# Patient Record
Sex: MALE | Race: WHITE | Employment: OTHER | URBAN - METROPOLITAN AREA
[De-identification: names, ages, dates, MRNs, and addresses within clinical notes are randomized per-mention and may not be internally consistent; named-entity substitution may affect disease eponyms.]

---

## 2018-07-31 ENCOUNTER — APPOINTMENT (OUTPATIENT)
Dept: CT IMAGING | Age: 43
End: 2018-07-31

## 2018-07-31 ENCOUNTER — HOSPITAL ENCOUNTER (EMERGENCY)
Age: 43
Discharge: HOME | End: 2018-07-31
Attending: EMERGENCY MEDICINE
Payer: COMMERCIAL

## 2018-07-31 ENCOUNTER — APPOINTMENT (OUTPATIENT)
Dept: GENERAL RADIOLOGY | Age: 43
End: 2018-07-31

## 2018-07-31 VITALS
WEIGHT: 228 LBS | HEIGHT: 71 IN | SYSTOLIC BLOOD PRESSURE: 122 MMHG | HEART RATE: 75 BPM | RESPIRATION RATE: 15 BRPM | OXYGEN SATURATION: 100 % | BODY MASS INDEX: 31.92 KG/M2 | TEMPERATURE: 98 F | DIASTOLIC BLOOD PRESSURE: 80 MMHG

## 2018-07-31 DIAGNOSIS — T07.XXXA MULTIPLE ABRASIONS: ICD-10-CM

## 2018-07-31 DIAGNOSIS — S09.90XA INJURY OF HEAD, INITIAL ENCOUNTER: Primary | ICD-10-CM

## 2018-07-31 LAB
BASOPHILS ABSOLUTE: 0 K/UL (ref 0–0.2)
BASOPHILS RELATIVE PERCENT: 0.4 %
EOSINOPHILS ABSOLUTE: 0.2 K/UL (ref 0–0.6)
EOSINOPHILS RELATIVE PERCENT: 2.8 %
HCT VFR BLD CALC: 45.1 % (ref 40.5–52.5)
HEMOGLOBIN: 15.2 G/DL (ref 13.5–17.5)
LYMPHOCYTES ABSOLUTE: 2.7 K/UL (ref 1–5.1)
LYMPHOCYTES RELATIVE PERCENT: 35 %
MCH RBC QN AUTO: 30.7 PG (ref 26–34)
MCHC RBC AUTO-ENTMCNC: 33.8 G/DL (ref 31–36)
MCV RBC AUTO: 90.9 FL (ref 80–100)
MONOCYTES ABSOLUTE: 0.6 K/UL (ref 0–1.3)
MONOCYTES RELATIVE PERCENT: 8 %
NEUTROPHILS ABSOLUTE: 4.2 K/UL (ref 1.7–7.7)
NEUTROPHILS RELATIVE PERCENT: 53.8 %
PDW BLD-RTO: 14.6 % (ref 12.4–15.4)
PLATELET # BLD: 259 K/UL (ref 135–450)
PMV BLD AUTO: 9.2 FL (ref 5–10.5)
RBC # BLD: 4.96 M/UL (ref 4.2–5.9)
WBC # BLD: 7.8 K/UL (ref 4–11)

## 2018-07-31 PROCEDURE — 93005 ELECTROCARDIOGRAM TRACING: CPT | Performed by: EMERGENCY MEDICINE

## 2018-07-31 PROCEDURE — 2580000003 HC RX 258: Performed by: EMERGENCY MEDICINE

## 2018-07-31 PROCEDURE — 6370000000 HC RX 637 (ALT 250 FOR IP): Performed by: EMERGENCY MEDICINE

## 2018-07-31 PROCEDURE — 99285 EMERGENCY DEPT VISIT HI MDM: CPT

## 2018-07-31 PROCEDURE — 85025 COMPLETE CBC W/AUTO DIFF WBC: CPT

## 2018-07-31 PROCEDURE — 70450 CT HEAD/BRAIN W/O DYE: CPT

## 2018-07-31 PROCEDURE — 73502 X-RAY EXAM HIP UNI 2-3 VIEWS: CPT

## 2018-07-31 PROCEDURE — 72125 CT NECK SPINE W/O DYE: CPT

## 2018-07-31 RX ORDER — 0.9 % SODIUM CHLORIDE 0.9 %
500 INTRAVENOUS SOLUTION INTRAVENOUS ONCE
Status: COMPLETED | OUTPATIENT
Start: 2018-07-31 | End: 2018-07-31

## 2018-07-31 RX ORDER — ACETAMINOPHEN 500 MG
1000 TABLET ORAL ONCE
Status: COMPLETED | OUTPATIENT
Start: 2018-07-31 | End: 2018-07-31

## 2018-07-31 RX ADMIN — ACETAMINOPHEN 1000 MG: 500 TABLET ORAL at 22:29

## 2018-07-31 RX ADMIN — SODIUM CHLORIDE 500 ML: 9 INJECTION, SOLUTION INTRAVENOUS at 22:18

## 2018-07-31 ASSESSMENT — PAIN SCALES - GENERAL
PAINLEVEL_OUTOF10: 7
PAINLEVEL_OUTOF10: 7

## 2018-07-31 ASSESSMENT — PAIN DESCRIPTION - FREQUENCY: FREQUENCY: CONTINUOUS

## 2018-07-31 ASSESSMENT — PAIN DESCRIPTION - ORIENTATION: ORIENTATION: LEFT

## 2018-07-31 ASSESSMENT — PAIN DESCRIPTION - PAIN TYPE: TYPE: ACUTE PAIN

## 2018-07-31 ASSESSMENT — PAIN DESCRIPTION - LOCATION: LOCATION: NECK;FACE;HEAD

## 2018-07-31 ASSESSMENT — PAIN DESCRIPTION - ONSET: ONSET: SUDDEN

## 2018-08-01 LAB
EKG ATRIAL RATE: 77 BPM
EKG DIAGNOSIS: NORMAL
EKG P AXIS: 49 DEGREES
EKG P-R INTERVAL: 154 MS
EKG Q-T INTERVAL: 366 MS
EKG QRS DURATION: 86 MS
EKG QTC CALCULATION (BAZETT): 414 MS
EKG R AXIS: 23 DEGREES
EKG T AXIS: 21 DEGREES
EKG VENTRICULAR RATE: 77 BPM

## 2018-08-01 PROCEDURE — 93010 ELECTROCARDIOGRAM REPORT: CPT | Performed by: INTERNAL MEDICINE

## 2018-08-01 NOTE — ED PROVIDER NOTES
[Azithromycin]         Physical Exam       ED Triage Vitals [07/31/18 2124]   BP Temp Temp src Pulse Resp SpO2 Height Weight   124/82 98 °F (36.7 °C) -- 74 16 93 % 5' 11\" (1.803 m) 228 lb (103.4 kg)     GENERAL APPEARANCE: Awake and alert. Cooperative. No acute distress. No obvious discomfort. HEAD: Normocephalic. Left anterior scalp abrasion with no deformity or crepitus. EYES: Sclera anicteric. The pupils are equal, round, and reactive light. ENT: Tolerates saliva. No trismus. NECK: Supple. Trachea midline. No point tenderness, crepitance, or obvious deformity. CARDIO: RRR. Radial pulse 2+. No audible murmur. LUNGS: Respirations unlabored. CTAB and symmetrical fair to good air movement. No chest wall tenderness or deformity. .  ABDOMEN: Soft. Non-distended. Non-tender. Normoactive bowel sounds. EXTREMITIES: No acute deformities. Abrasion at the left deltoid, left elbow, and left lateral hip. SKIN: Warm and dry. As above otherwise unremarkable. NEUROLOGICAL: No gross facial drooping. Moves all 4 extremities spontaneously. Awake alert. GCS 15.  Speech is clear. No gross motor or sensory deficits. Cranial nerves III-12 grossly intact. Speech is clear. PSYCHIATRIC: Normal mood.      Diagnostics   Labs:  Results for orders placed or performed during the hospital encounter of 07/31/18   CBC auto differential   Result Value Ref Range    WBC 7.8 4.0 - 11.0 K/uL    RBC 4.96 4.20 - 5.90 M/uL    Hemoglobin 15.2 13.5 - 17.5 g/dL    Hematocrit 45.1 40.5 - 52.5 %    MCV 90.9 80.0 - 100.0 fL    MCH 30.7 26.0 - 34.0 pg    MCHC 33.8 31.0 - 36.0 g/dL    RDW 14.6 12.4 - 15.4 %    Platelets 159 931 - 897 K/uL    MPV 9.2 5.0 - 10.5 fL    Neutrophils % 53.8 %    Lymphocytes % 35.0 %    Monocytes % 8.0 %    Eosinophils % 2.8 %    Basophils % 0.4 %    Neutrophils # 4.2 1.7 - 7.7 K/uL    Lymphocytes # 2.7 1.0 - 5.1 K/uL    Monocytes # 0.6 0.0 - 1.3 K/uL    Eosinophils # 0.2 0.0 - 0.6 K/uL    Basophils # 0.0 0.0 - was without acute findings. X-ray left hip without acute fracture or dislocation. Multiple abrasions had local wound care. The tetanus is up-to-date. The patient is to take Tylenol for pain as needed as rectal container. He will have a prescription for Zofran. He and his wife are given cautioned regarding possible concussion and to avoid any alcohol or reinjury to the head. They are to go to the local emergency department if any worsening or concerning symptoms. He is otherwise stable and in no acute distress. ED Medication Orders     Start Ordered     Status Ordering Provider    07/31/18 2230 07/31/18 2227  acetaminophen (TYLENOL) tablet 1,000 mg  ONCE      Last MAR action:  Given - by Ivonne Gao on 07/31/18 at 2229 [facility]    07/31/18 2145 07/31/18 2131  0.9 % sodium chloride bolus  ONCE      Last MAR action:  Stopped - by Ivonne Gao on 07/31/18 at 2230 [address]          Final Impression      1. Injury of head, initial encounter    2.  Multiple abrasions      DISPOSITION  : Discharge     (Please note that portions of this note may have been completed with a voice recognition program. Efforts were made to edit the dictations but occasionally words are mis-transcribed.)    Andi Rico MD  [address] MD  07/31/18 Abhi Freeman MD  07/31/18 3997

## 2018-08-01 NOTE — ED NOTES
Pt was transported to radiology on stretcher, once in the CT room, pt ripped C-collar off and said he wasn't keeping it on and became belligerent with radiology staff. Dr. Meryle Regal went to see pt and requested have his wife come to CT to discuss the risks of the pt not wearing a C-collar. Dr. Meryle Regal discussed the risks and the wife requested to have a moment alone with her . After a few minutes, radiology called to say that the pt was now agreeable to wear the C-collar and cooperate with plan of care.       Maria Esther Mcarthur, RN  07/31/18 1204

## 2019-07-15 ENCOUNTER — HOSPITAL ENCOUNTER (EMERGENCY)
Age: 44
Discharge: HOME | End: 2019-07-15
Payer: COMMERCIAL

## 2019-07-15 ENCOUNTER — APPOINTMENT (OUTPATIENT)
Dept: GENERAL RADIOLOGY | Age: 44
End: 2019-07-15
Payer: COMMERCIAL

## 2019-07-15 VITALS
DIASTOLIC BLOOD PRESSURE: 91 MMHG | TEMPERATURE: 97.7 F | SYSTOLIC BLOOD PRESSURE: 123 MMHG | HEIGHT: 71 IN | RESPIRATION RATE: 14 BRPM | BODY MASS INDEX: 30.8 KG/M2 | HEART RATE: 86 BPM | WEIGHT: 220 LBS | OXYGEN SATURATION: 95 %

## 2019-07-15 DIAGNOSIS — S52.515A CLOSED NONDISPLACED FRACTURE OF STYLOID PROCESS OF LEFT RADIUS, INITIAL ENCOUNTER: Primary | ICD-10-CM

## 2019-07-15 PROCEDURE — 4500000023 HC ED LEVEL 3 PROCEDURE

## 2019-07-15 PROCEDURE — 6370000000 HC RX 637 (ALT 250 FOR IP): Performed by: NURSE PRACTITIONER

## 2019-07-15 PROCEDURE — 99283 EMERGENCY DEPT VISIT LOW MDM: CPT

## 2019-07-15 PROCEDURE — 73110 X-RAY EXAM OF WRIST: CPT

## 2019-07-15 RX ORDER — HYDROCODONE BITARTRATE AND ACETAMINOPHEN 5; 325 MG/1; MG/1
1 TABLET ORAL EVERY 6 HOURS PRN
Qty: 10 TABLET | Refills: 0 | Status: SHIPPED | OUTPATIENT
Start: 2019-07-15 | End: 2019-07-18

## 2019-07-15 RX ORDER — IBUPROFEN 800 MG/1
800 TABLET ORAL ONCE
Status: COMPLETED | OUTPATIENT
Start: 2019-07-15 | End: 2019-07-15

## 2019-07-15 RX ORDER — HYDROCODONE BITARTRATE AND ACETAMINOPHEN 5; 325 MG/1; MG/1
1 TABLET ORAL ONCE
Status: COMPLETED | OUTPATIENT
Start: 2019-07-15 | End: 2019-07-15

## 2019-07-15 RX ADMIN — IBUPROFEN 800 MG: 800 TABLET, FILM COATED ORAL at 21:53

## 2019-07-15 RX ADMIN — HYDROCODONE BITARTRATE AND ACETAMINOPHEN 1 TABLET: 5; 325 TABLET ORAL at 22:31

## 2019-07-15 ASSESSMENT — PAIN SCALES - GENERAL
PAINLEVEL_OUTOF10: 7
PAINLEVEL_OUTOF10: 8
PAINLEVEL_OUTOF10: 8

## 2019-07-15 ASSESSMENT — PAIN DESCRIPTION - ORIENTATION: ORIENTATION: LEFT

## 2019-07-15 ASSESSMENT — PAIN DESCRIPTION - LOCATION: LOCATION: WRIST

## 2019-07-16 NOTE — ED PROVIDER NOTES
[facility]  EMERGENCY [facility]PARTMENT ENCOUNTER      Pt Name: Kailee Schwartz  CGL:[id number]  [place] 1975  Date of evaluation: 7/15/2019  Provider: JEANNIE Russell CNP      Chief Complaint:    Chief Complaint   Patient presents with    Wrist Injury     fall from skateboard, left wrist injury       Nursing Notes, Past Medical Hx, Past Surgical Hx, Social Hx, Allergies, and Family Hx were all reviewed and agreed with or any disagreements were addressed in the HPI.    HPI:  (Location, Duration, Timing, Severity,Quality, Assoc Sx, Context, Modifying factors)  This is a  38 y.o. male who presents the emergency department with complaints of a left wrist injury. Patient reports that he fell on an outstretched left hand off of a motorized skateboard. This happened about an hour prior to arrival.  Pain is about an 8 out of 10. He is right-hand dominant. He has not taken anything for the pain. Is worse with movement and improved at rest.  He currently has ice on the injury. He does report decreased  strength of the left hand. PastMedical/Surgical History:      Diagnosis Date    Anxiety     Depression     Hyperlipidemia      History reviewed. No pertinent surgical history. Medications:  Discharge Medication List as of 7/15/2019 10:24 PM            Review of Systems:  Review of Systems   Musculoskeletal: Positive for arthralgias. Negative for joint swelling. Neurological: Positive for weakness. Negative for numbness. All other systems reviewed and are negative. Positives and Pertinent negatives as per HPI. Except as noted above in the ROS, problem specific ROS was completed and is negative. Physical Exam:  Physical Exam   Constitutional: He appears well-developed and well-nourished. HENT:   Head: Normocephalic and atraumatic.    Right Ear: External ear normal.   Left Ear: External ear normal.   Nose: Nose normal. Eyes: Conjunctivae are normal.   Neck: Normal range of motion. Cardiovascular: Normal rate. Pulmonary/Chest: Effort normal.   Abdominal: He exhibits no distension. Musculoskeletal:        Left wrist: He exhibits decreased range of motion (Pain with range of motion. Decreased  strength of left hand secondary to pain.), tenderness and bony tenderness. He exhibits no swelling. Neurological: He is alert. Skin: Skin is warm and dry. Capillary refill takes less than 2 seconds. Psychiatric: He has a normal mood and affect. His behavior is normal.   Nursing note and vitals reviewed. MEDICAL DECISION MAKING    Vitals:    Vitals:    07/15/19 2122   BP: (!) 123/91   Pulse: 86   Resp: 14   Temp: 97.7 °F (36.5 °C)   TempSrc: Oral   SpO2: 95%   Weight: 220 lb (99.8 kg)   Height: 5' 11\" (1.803 m)       LABS:Labs Reviewed - No data to display     Remainder of labs reviewed and werenegative at this time or not returned at the time of this note. RADIOLOGY:   Non-plain film images such as CT, Ultrasound and MRI are read by the radiologist. JEANNIE Bullock CNP have directly visualized the radiologic plain film image(s) with the below findings:        Interpretation per the Radiologist below, if available at the time of thisnote:    XR WRIST LEFT (MIN 3 VIEWS)   Final Result   Acute fracture through the base of the radial styloid. No results found. MEDICAL DECISION MAKING / ED COURSE:      PROCEDURES:   Procedures    None    Patient was given:  Medications   ibuprofen (ADVIL;MOTRIN) tablet 800 mg (800 mg Oral Given 7/15/19 2153)   HYDROcodone-acetaminophen (NORCO) 5-325 MG per tablet 1 tablet (1 tablet Oral Given 7/15/19 2231)       [facility] department with complaints of left wrist pain. Physical exam did reveal some tenderness to his left wrist.  X-ray was obtained and did show acute fracture to the base of the radial styloid.   He was placed in a thumb spica splint and referred to [facility]  [phone]  Go to   As needed, If symptoms worsen      DISCHARGE MEDICATIONS:  Discharge Medication List as of 7/15/2019 10:24 PM      START taking these medications    Details   HYDROcodone-acetaminophen (NORCO) 5-325 MG per tablet Take 1 tablet by mouth every 6 hours as needed for Pain for up to 3 days. , Disp-10 tablet, R-0Print             DISCONTINUED MEDICATIONS:  Discharge Medication List as of 7/15/2019 10:24 PM      STOP taking these medications       sertraline (ZOLOFT) 100 MG tablet Comments:   Reason for Stopping:                      (Please note the MDM and HPI sections of this note were completed with a voice recognition program.  Efforts weremade to edit the dictations but occasionally words are mis-transcribed.)    Electronically signed, JEANNIE Dawn CNP,        JEANNIE Dawn CNP  07/16/19 6241

## 2023-06-19 ENCOUNTER — APPOINTMENT (OUTPATIENT)
Dept: GENERAL RADIOLOGY | Age: 48
End: 2023-06-19
Payer: COMMERCIAL

## 2023-06-19 ENCOUNTER — APPOINTMENT (OUTPATIENT)
Dept: CARDIAC CATH/INVASIVE PROCEDURES | Age: 48
End: 2023-06-19
Payer: COMMERCIAL

## 2023-06-19 ENCOUNTER — HOSPITAL ENCOUNTER (OUTPATIENT)
Age: 48
Discharge: HOME | End: 2023-06-19
Attending: EMERGENCY MEDICINE | Admitting: INTERNAL MEDICINE
Payer: COMMERCIAL

## 2023-06-19 VITALS
HEIGHT: 70 IN | RESPIRATION RATE: 16 BRPM | TEMPERATURE: 98.7 F | DIASTOLIC BLOOD PRESSURE: 91 MMHG | BODY MASS INDEX: 31.07 KG/M2 | OXYGEN SATURATION: 100 % | WEIGHT: 217 LBS | SYSTOLIC BLOOD PRESSURE: 132 MMHG | HEART RATE: 67 BPM

## 2023-06-19 DIAGNOSIS — I21.4 NSTEMI (NON-ST ELEVATED MYOCARDIAL INFARCTION) (HCC): ICD-10-CM

## 2023-06-19 DIAGNOSIS — R07.9 CHEST PAIN, UNSPECIFIED TYPE: Primary | ICD-10-CM

## 2023-06-19 LAB
ALBUMIN SERPL-MCNC: 4.8 G/DL (ref 3.4–5)
ALBUMIN/GLOB SERPL: 1.5 {RATIO} (ref 1.1–2.2)
ALP SERPL-CCNC: 80 U/L (ref 40–129)
ALT SERPL-CCNC: 25 U/L (ref 10–40)
ANION GAP SERPL CALCULATED.3IONS-SCNC: 15 MMOL/L (ref 3–16)
APTT BLD: 29.9 SEC (ref 22.7–35.9)
AST SERPL-CCNC: 19 U/L (ref 15–37)
BASOPHILS # BLD: 0 K/UL (ref 0–0.2)
BASOPHILS NFR BLD: 0.7 %
BILIRUB SERPL-MCNC: 0.4 MG/DL (ref 0–1)
BUN SERPL-MCNC: 19 MG/DL (ref 7–20)
CALCIUM SERPL-MCNC: 10.2 MG/DL (ref 8.3–10.6)
CHLORIDE SERPL-SCNC: 104 MMOL/L (ref 99–110)
CO2 SERPL-SCNC: 20 MMOL/L (ref 21–32)
CREAT SERPL-MCNC: 1 MG/DL (ref 0.9–1.3)
DEPRECATED RDW RBC AUTO: 14.3 % (ref 12.4–15.4)
EKG ATRIAL RATE: 52 BPM
EKG DIAGNOSIS: NORMAL
EKG P AXIS: 36 DEGREES
EKG P-R INTERVAL: 154 MS
EKG Q-T INTERVAL: 422 MS
EKG QRS DURATION: 88 MS
EKG QTC CALCULATION (BAZETT): 392 MS
EKG R AXIS: 10 DEGREES
EKG T AXIS: 15 DEGREES
EKG VENTRICULAR RATE: 52 BPM
EOSINOPHIL # BLD: 0.1 K/UL (ref 0–0.6)
EOSINOPHIL NFR BLD: 2.2 %
GFR SERPLBLD CREATININE-BSD FMLA CKD-EPI: >60 ML/MIN/{1.73_M2}
GLUCOSE SERPL-MCNC: 125 MG/DL (ref 70–99)
HCT VFR BLD AUTO: 48.8 % (ref 40.5–52.5)
HGB BLD-MCNC: 16 G/DL (ref 13.5–17.5)
LV EF: 58 %
LV EF: 60 %
LVEF MODALITY: NORMAL
LVEF MODALITY: NORMAL
LYMPHOCYTES # BLD: 1.8 K/UL (ref 1–5.1)
LYMPHOCYTES NFR BLD: 26.3 %
MCH RBC QN AUTO: 30.5 PG (ref 26–34)
MCHC RBC AUTO-ENTMCNC: 32.7 G/DL (ref 31–36)
MCV RBC AUTO: 93.2 FL (ref 80–100)
MONOCYTES # BLD: 0.7 K/UL (ref 0–1.3)
MONOCYTES NFR BLD: 9.8 %
NEUTROPHILS # BLD: 4.1 K/UL (ref 1.7–7.7)
NEUTROPHILS NFR BLD: 61 %
PLATELET # BLD AUTO: 261 K/UL (ref 135–450)
PMV BLD AUTO: 8.5 FL (ref 5–10.5)
POTASSIUM SERPL-SCNC: 4.2 MMOL/L (ref 3.5–5.1)
PROT SERPL-MCNC: 8 G/DL (ref 6.4–8.2)
RBC # BLD AUTO: 5.24 M/UL (ref 4.2–5.9)
SODIUM SERPL-SCNC: 139 MMOL/L (ref 136–145)
TROPONIN, HIGH SENSITIVITY: 65 NG/L (ref 0–22)
TROPONIN, HIGH SENSITIVITY: 8 NG/L (ref 0–22)
WBC # BLD AUTO: 6.8 K/UL (ref 4–11)

## 2023-06-19 PROCEDURE — 85730 THROMBOPLASTIN TIME PARTIAL: CPT

## 2023-06-19 PROCEDURE — 84484 ASSAY OF TROPONIN QUANT: CPT

## 2023-06-19 PROCEDURE — 2709999900 HC NON-CHARGEABLE SUPPLY

## 2023-06-19 PROCEDURE — 6360000002 HC RX W HCPCS: Performed by: EMERGENCY MEDICINE

## 2023-06-19 PROCEDURE — 36415 COLL VENOUS BLD VENIPUNCTURE: CPT

## 2023-06-19 PROCEDURE — 99222 1ST HOSP IP/OBS MODERATE 55: CPT | Performed by: INTERNAL MEDICINE

## 2023-06-19 PROCEDURE — 6370000000 HC RX 637 (ALT 250 FOR IP): Performed by: EMERGENCY MEDICINE

## 2023-06-19 PROCEDURE — 93306 TTE W/DOPPLER COMPLETE: CPT

## 2023-06-19 PROCEDURE — 80053 COMPREHEN METABOLIC PANEL: CPT

## 2023-06-19 PROCEDURE — 93458 L HRT ARTERY/VENTRICLE ANGIO: CPT

## 2023-06-19 PROCEDURE — 93010 ELECTROCARDIOGRAM REPORT: CPT | Performed by: INTERNAL MEDICINE

## 2023-06-19 PROCEDURE — C1769 GUIDE WIRE: HCPCS

## 2023-06-19 PROCEDURE — 2500000003 HC RX 250 WO HCPCS

## 2023-06-19 PROCEDURE — 85025 COMPLETE CBC W/AUTO DIFF WBC: CPT

## 2023-06-19 PROCEDURE — 99152 MOD SED SAME PHYS/QHP 5/>YRS: CPT

## 2023-06-19 PROCEDURE — 96366 THER/PROPH/DIAG IV INF ADDON: CPT

## 2023-06-19 PROCEDURE — 93005 ELECTROCARDIOGRAM TRACING: CPT | Performed by: EMERGENCY MEDICINE

## 2023-06-19 PROCEDURE — 99285 EMERGENCY DEPT VISIT HI MDM: CPT

## 2023-06-19 PROCEDURE — 71045 X-RAY EXAM CHEST 1 VIEW: CPT

## 2023-06-19 PROCEDURE — 6360000002 HC RX W HCPCS

## 2023-06-19 PROCEDURE — C1894 INTRO/SHEATH, NON-LASER: HCPCS

## 2023-06-19 PROCEDURE — 96365 THER/PROPH/DIAG IV INF INIT: CPT

## 2023-06-19 RX ORDER — LISINOPRIL 20 MG/1
20 TABLET ORAL DAILY
Status: DISCONTINUED | OUTPATIENT
Start: 2023-06-19 | End: 2023-06-19 | Stop reason: HOSPADM

## 2023-06-19 RX ORDER — HEPARIN SODIUM 1000 [USP'U]/ML
4000 INJECTION, SOLUTION INTRAVENOUS; SUBCUTANEOUS ONCE
Status: COMPLETED | OUTPATIENT
Start: 2023-06-19 | End: 2023-06-19

## 2023-06-19 RX ORDER — SODIUM CHLORIDE 0.9 % (FLUSH) 0.9 %
5-40 SYRINGE (ML) INJECTION EVERY 12 HOURS SCHEDULED
Status: DISCONTINUED | OUTPATIENT
Start: 2023-06-19 | End: 2023-06-19 | Stop reason: HOSPADM

## 2023-06-19 RX ORDER — ATORVASTATIN CALCIUM 40 MG/1
80 TABLET, FILM COATED ORAL NIGHTLY
Status: DISCONTINUED | OUTPATIENT
Start: 2023-06-19 | End: 2023-06-19

## 2023-06-19 RX ORDER — MIDAZOLAM HYDROCHLORIDE 1 MG/ML
INJECTION INTRAMUSCULAR; INTRAVENOUS
Status: COMPLETED | OUTPATIENT
Start: 2023-06-19 | End: 2023-06-19

## 2023-06-19 RX ORDER — LISINOPRIL 20 MG/1
20 TABLET ORAL DAILY
COMMUNITY
Start: 2023-03-20

## 2023-06-19 RX ORDER — ONDANSETRON 4 MG/1
4 TABLET, ORALLY DISINTEGRATING ORAL EVERY 8 HOURS PRN
Status: DISCONTINUED | OUTPATIENT
Start: 2023-06-19 | End: 2023-06-19 | Stop reason: HOSPADM

## 2023-06-19 RX ORDER — EZETIMIBE 10 MG/1
10 TABLET ORAL DAILY
Status: DISCONTINUED | OUTPATIENT
Start: 2023-06-19 | End: 2023-06-19 | Stop reason: HOSPADM

## 2023-06-19 RX ORDER — FENTANYL CITRATE 50 UG/ML
INJECTION, SOLUTION INTRAMUSCULAR; INTRAVENOUS
Status: COMPLETED | OUTPATIENT
Start: 2023-06-19 | End: 2023-06-19

## 2023-06-19 RX ORDER — HEPARIN SODIUM 1000 [USP'U]/ML
2000 INJECTION, SOLUTION INTRAVENOUS; SUBCUTANEOUS PRN
Status: DISCONTINUED | OUTPATIENT
Start: 2023-06-19 | End: 2023-06-19 | Stop reason: HOSPADM

## 2023-06-19 RX ORDER — ONDANSETRON 2 MG/ML
4 INJECTION INTRAMUSCULAR; INTRAVENOUS EVERY 6 HOURS PRN
Status: DISCONTINUED | OUTPATIENT
Start: 2023-06-19 | End: 2023-06-19 | Stop reason: HOSPADM

## 2023-06-19 RX ORDER — EZETIMIBE 10 MG/1
10 TABLET ORAL DAILY
COMMUNITY
Start: 2023-03-21

## 2023-06-19 RX ORDER — HEPARIN SODIUM 1000 [USP'U]/ML
INJECTION, SOLUTION INTRAVENOUS; SUBCUTANEOUS
Status: COMPLETED | OUTPATIENT
Start: 2023-06-19 | End: 2023-06-19

## 2023-06-19 RX ORDER — ACETAMINOPHEN 650 MG/1
650 SUPPOSITORY RECTAL EVERY 6 HOURS PRN
Status: DISCONTINUED | OUTPATIENT
Start: 2023-06-19 | End: 2023-06-19 | Stop reason: HOSPADM

## 2023-06-19 RX ORDER — LISINOPRIL 20 MG/1
20 TABLET ORAL ONCE
Status: COMPLETED | OUTPATIENT
Start: 2023-06-19 | End: 2023-06-19

## 2023-06-19 RX ORDER — HEPARIN SODIUM 1000 [USP'U]/ML
4000 INJECTION, SOLUTION INTRAVENOUS; SUBCUTANEOUS PRN
Status: DISCONTINUED | OUTPATIENT
Start: 2023-06-19 | End: 2023-06-19 | Stop reason: HOSPADM

## 2023-06-19 RX ORDER — ACETAMINOPHEN 325 MG/1
650 TABLET ORAL EVERY 6 HOURS PRN
Status: DISCONTINUED | OUTPATIENT
Start: 2023-06-19 | End: 2023-06-19 | Stop reason: HOSPADM

## 2023-06-19 RX ORDER — POLYETHYLENE GLYCOL 3350 17 G/17G
17 POWDER, FOR SOLUTION ORAL DAILY PRN
Status: DISCONTINUED | OUTPATIENT
Start: 2023-06-19 | End: 2023-06-19 | Stop reason: HOSPADM

## 2023-06-19 RX ORDER — SODIUM CHLORIDE 9 MG/ML
INJECTION, SOLUTION INTRAVENOUS PRN
Status: DISCONTINUED | OUTPATIENT
Start: 2023-06-19 | End: 2023-06-19 | Stop reason: HOSPADM

## 2023-06-19 RX ORDER — SODIUM CHLORIDE 0.9 % (FLUSH) 0.9 %
5-40 SYRINGE (ML) INJECTION PRN
Status: DISCONTINUED | OUTPATIENT
Start: 2023-06-19 | End: 2023-06-19 | Stop reason: HOSPADM

## 2023-06-19 RX ORDER — HEPARIN SODIUM 10000 [USP'U]/100ML
1000 INJECTION, SOLUTION INTRAVENOUS CONTINUOUS
Status: DISCONTINUED | OUTPATIENT
Start: 2023-06-19 | End: 2023-06-19 | Stop reason: HOSPADM

## 2023-06-19 RX ADMIN — MIDAZOLAM HYDROCHLORIDE 1 MG: 1 INJECTION INTRAMUSCULAR; INTRAVENOUS at 14:29

## 2023-06-19 RX ADMIN — HEPARIN SODIUM 1000 UNITS/HR: 10000 INJECTION, SOLUTION INTRAVENOUS at 11:58

## 2023-06-19 RX ADMIN — HEPARIN SODIUM 4000 UNITS: 1000 INJECTION INTRAVENOUS; SUBCUTANEOUS at 11:55

## 2023-06-19 RX ADMIN — LISINOPRIL 20 MG: 20 TABLET ORAL at 07:57

## 2023-06-19 RX ADMIN — HEPARIN SODIUM 5000 UNITS: 1000 INJECTION, SOLUTION INTRAVENOUS; SUBCUTANEOUS at 14:32

## 2023-06-19 RX ADMIN — FENTANYL CITRATE 50 MCG: 50 INJECTION, SOLUTION INTRAMUSCULAR; INTRAVENOUS at 14:29

## 2023-06-19 ASSESSMENT — PAIN SCALES - GENERAL: PAINLEVEL_OUTOF10: 2

## 2023-06-19 ASSESSMENT — PAIN - FUNCTIONAL ASSESSMENT: PAIN_FUNCTIONAL_ASSESSMENT: 0-10

## 2023-06-19 NOTE — PRE SEDATION
Sedation Pre-Procedure Note    Patient Name: Michael [place]   YOB: 1975  Room/Bed: Cath Pool Rm/NONE  Medical Record Number: [id number]  Date: 6/19/2023   Time: 2:51 PM       Indication:  elevated troponin    Consent: I have discussed with the patient and/or the patient representative the indication, alternatives, and the possible risks and/or complications of the planned procedure and the anesthesia methods. The patient and/or patient representative appear to understand and agree to proceed. Vital Signs:   Vitals:    06/19/23 1323   BP: (!) 132/91   Pulse: 67   Resp: 16   Temp:    SpO2: 100%       Past Medical History:   has a past medical history of Anxiety, Depression, Hyperlipidemia, and Hypertension. Past Surgical History:   has no past surgical history on file. Medications:   Scheduled Meds:    ezetimibe  10 mg Oral Daily    lisinopril  20 mg Oral Daily    sodium chloride flush  5-40 mL IntraVENous 2 times per day     Continuous Infusions:    heparin (PORCINE) Infusion Stopped (06/19/23 1410)    sodium chloride       PRN Meds: perflutren lipid microspheres, heparin (porcine), heparin (porcine), sodium chloride flush, sodium chloride, ondansetron **OR** ondansetron, acetaminophen **OR** acetaminophen, polyethylene glycol  Home Meds:   Prior to Admission medications    Medication Sig Start Date End Date Taking? Authorizing Provider   ezetimibe (ZETIA) 10 MG tablet Take 1 tablet by mouth daily 3/21/23  Yes Historical Provider, MD   lisinopril (PRINIVIL;ZESTRIL) 20 MG tablet Take 1 tablet by mouth daily 3/20/23  Yes Historical Provider, MD     Coumadin Use Last 7 Days:  No  Antiplatelet drug therapy use last 7 days: Yes - Aspirin  Other anticoagulant use last 7 days: No  Additional Medication Information:  No      Pre-Sedation Documentation and Exam:   I have personally completed a history, physical exam & review of systems for this patient (see notes).   Vital signs have been reviewed (see flow

## 2023-06-19 NOTE — DISCHARGE SUMMARY
V2.0  Discharge Summary    Name:  Adelia Ruiz /Age/Sex: 1975 (51 y.o. male)   Admit Date: 2023  Discharge Date: 23    MRN & CSN:  [id number] & [id number] Encounter Date and Time 23 4:30 PM EDT    Attending:  No att. providers found Discharging Provider: Lalo Boogie DO       Hospital Course:     Brief HPI: Adelia Ruiz is a 51 y.o. male with a pmh of HTN and HLD who presents with chest pain and elevated troponin. Brief Problem Based Course:   Chest pain  EKG, ECHO, and cardiac cath were all negative. Troponin on arrival though increased to 64 in a couple of hours  Patients symptoms resolved shortly after presentation and he remained asymptomatic for the rest of his stay. Given unrevealing EKG and ECHO and  normal coronaries, the cause of his symptoms is most likely noncardiac. Cardio cleared patient for DC. The patient expressed appropriate understanding of, and agreement with the discharge recommendations, medications, and plan.      Consults this admission:  IP CONSULT TO CARDIOLOGY  IP CONSULT TO HOSPITALIST    Discharge Diagnosis:   <principal problem not specified>  Chest Pain, NSTEMI    Discharge Instruction:   Follow up appointments:   Primary care physician: Soo Abraham within 2 weeks  Diet: regular diet   Activity: activity as tolerated  Disposition: Discharged to:   [x]Home, []C, []SNF, []Acute Rehab, []Hospice   Condition on discharge: Stable  Labs and Tests to be Followed up as an outpatient by PCP or Specialist:     Discharge Medications:        Medication List        CONTINUE taking these medications      ezetimibe 10 MG tablet  Commonly known as: ZETIA     lisinopril 20 MG tablet  Commonly known as: PRINIVIL;ZESTRIL             Objective Findings at Discharge:   BP (!) 132/91   Pulse 67   Temp 98.7 °F (37.1 °C) (Oral)   Resp 16   Ht 5' 10\" (1.778 m)   Wt 217 lb (98.4 kg)   SpO2 100%   BMI 31.14 kg/m²       Physical Exam:     General:

## 2023-06-19 NOTE — ED NOTES
ED CARDIO CONSULT  [id number]- PAGED [facility] CARDIO  RE- NSTEMI, CHEST PAIN  [address] @ BEDSIDE      Martina Desai  06/19/23 1123

## 2023-06-19 NOTE — CONSULTS
Pharmacy to Manage Heparin Infusion per [facility]    Dx: NSTEMI / Chest pain  Patient weight = 98.4 kg (will use adjusted wt if actual body weight > 120% ideal body weight). Dosing weight: 83.2 kg (ABW)  Baseline aPTT = 29.9 sec at 0746. Oral factor Xa-inhibitors may alter and elevate anti-Xa levels used for unfractionated heparin monitoring. As a result, anti-Xa monitoring is not accurate while Xa-inhibitor activity is detectable. Utilize aPTT monitoring when patient received an oral factor Xa-inhibitor (apixaban, betrixaban, edoxaban or rivaroxaban) within 72 hours prior to admission (please document last administration time). The goal is to allow a washout of oral factor Xa-inhibitors by using aPTT for 72 hours, then change to ant-Xa levels for UFH. Heparin (weight-based) Infusion: CAD/STEMI/NSTEMI/UA/AFib)   Heparin 60 units/kg IVP bolus (max 4,000 units) followed by Heparin infusion at 12 units/kg/hr (recommended max initial rate: 1000 units/hr). Recheck anti-Xa (unless aPTT being used) in 6 hours. Goal anti-Xa 0.3-0.7 IU/mL  Goal aPTT =  seconds. Pharmacy to manage Heparin - contact for questions. Heparin 60 units/kg IV x 1 (max 4,000 units), then 12 units/kg/hr (recommended max initial rate 1,000 units/hr). Adjust infusion rate based off anti-Xa results below. anti-Xa < 0.1    Heparin 60 units/kg bolus  Increase infusion by 4 units/kg/hr  anti-Xa 0.1-0.29 Heparin 30 units/kg bolus Increase infusion by 2 units/kg/hr  anti-Xa 0.3-0.7    No bolus No change   anti-Xa 0.71-0.8   No bolus Decrease infusion by 1 units/kg/hr  anti-Xa 0.81-0.99    No bolus Decrease infusion by 2 units/kg/hr  anti-Xa 1 or more     Hold heparin for 1 hour Decrease infusion by 3 units/kg/hr    Obtain anti-Xa hours after bolus and 6 hours after any dose change until two consecutive therapeutic anti-Xa are achieved- then daily.     Mohsen Bales, PharmD 6/19/2023  11:39 AM

## 2023-06-19 NOTE — H&P
V2.0  History and Physical      Name:  Katey Mendez /Age/Sex: 1975  (51 y.o. male)   MRN & CSN:  [id number] & [id number] Encounter Date/Time: 2023 1:44 PM EDT   Location:  [place] [address] PCP: [address] [place] [address] Road Day: 1    Assessment and Plan:   Katey Mendez is a 51 y.o. male with a pmh of HTN and HLD who presents with chest pain and elevated troponin. Patient states chest pain came on suddenly while he was relaxing and watching TV at about 0630 this AM. . He described the pain as \"Pressure\" and \"tightness\" in his chest with radiating discomfort (described as \"just short of numb\" ) into his b/l biceps. He denies any associated SOA, lightheadedness, syncope or edema. . He states the pain lasted about 1-1.5 hours then resolved spontaneously. He denies any similar episodes in the past.  Denies any personal or family history of heart disease. Cardio consulted. On presentation to the ED, troponin was mildly elevated and rising. EKG without any significant changes of ischemia. Echo without acute pathologies. Plan:  Chest pain  Elevated Troponin  Cardio consulting  EKG and ECHO without acute findings. Increasing troponin. Trend troponin  Plan for coronary angiogram today - keep NPO  Started on IV heparin. Pharm to dose. He is allergic to Advil and statins. HTN  Continue home Lisinopril 20mg daily  HLD  Continue home fenofibrate    Disposition:   Current Living situation: Home with wife  Expected Disposition: Home with wife. Estimated D/C: 1-2 days pending cardio workup. Diet ADULT DIET; Clear Liquid;  No Caffeine   DVT Prophylaxis [] Lovenox, [x]  Heparin, [] SCDs, [] Ambulation,  [] Eliquis, [] Xarelto, [] Coumadin   Code Status Full Code   Surrogate Decision Maker/ REINALDO Arreola (Spouse)     Personally reviewed Lab Studies and Imaging             History from:     patient, spouse    History of Present Illness:     Chief Complaint:   Katey Mendez is a 51 y.o. male with pmh of HTN

## 2023-06-19 NOTE — ED PROVIDER NOTES
acute cardiopulmonary abnormality identified. All other labs/imaging were within normal range or not returned as of this dictation. EMERGENCY DEPARTMENT COURSE and DIFFERENTIAL DIAGNOSIS/MDM:   Vitals:    Vitals:    06/19/23 1054 06/19/23 1130 06/19/23 1210 06/19/23 1323   BP: 138/85  131/80 (!) 132/91   Pulse: 64  60 67   Resp: 16  16 16   Temp:       TempSrc:       SpO2: 100%  97% 100%   Weight:       Height:  5' 10\" (1.778 m)         EKG *Interpreted by me*: Sinus bradycardia rate of 52 bpm.  Q wave in lead III with flipped T wave in lead III. No ST elevation. Similar to prior from 7/31/2018    Differential Diagnosis: ACS, PE, MI, gastritis    Patient presents emergency department today complaining of fairly typical sounding chest pain. Initially quite hypertensive on arrival this did improve without direct intervention. Initial EKG without any obvious changes. Initial troponin negative. Unfortunately, serial troponin testing did show significant elevation of the patient's troponin level. Currently pain-free at present. Given aspirin. Discussed case with cardiology and the patient was placed on a heparin drip and will be admitted to the hospital for further management. Low suspicion for PE at present. More concerning for underlying ACS. Medications and Route:   Medications   lisinopril (PRINIVIL;ZESTRIL) tablet 20 mg (20 mg Oral Given 6/19/23 0757)   heparin (porcine) injection 4,000 Units (4,000 Units IntraVENous Given 6/19/23 1155)   midazolam (VERSED) injection (1 mg IntraVENous Given 6/19/23 1429)   fentaNYL (SUBLIMAZE) injection (50 mcg IntraVENous Given 6/19/23 1429)   heparin (porcine) injection (5,000 Units IntraVENous Given 6/19/23 1432)       History From: Patient         Chronic Conditions: Noted in HPI    CONSULTS: (Who and What was discussed)  IP CONSULT TO CARDIOLOGY  IP CONSULT TO HOSPITALIST            Fang DIA M.D., am the primary clinician of record.

## 2023-06-19 NOTE — CONSULTS
[id number] [facility] COMPLAINT  Chest pain, troponin elevation      HISTORY OF PRESENTING ILLNESS  Berhane Rooney is a 51 y.o. patient with history of hypertension hyperlipidemia who presented to the hospital with complaints of new onset chest pain. I have been asked to provide consultation regarding further management and testing. He describes chest pain starting at 630 this morning while he was having breakfast.  He describes chest pain as pressure-like, substernal, radiating to both shoulders and down the arms. It is not associated with shortness of breath. He denies having any similar symptoms in the past.  He is not sure if the chest pain today has gotten worse with exertion or not. He denies taking any medications at home for relief of chest pain. He denies associated edema, orthopnea, lightheadedness, palpitations, syncope. In the ED his troponin was noted to be mildly elevated and rising. ECG was without any significant changes of ischemia. He was started on IV heparin    PAST MEDICAL HISTORY   has a past medical history of Anxiety, Depression, Hyperlipidemia, and Hypertension. SURGICAL HISTORY   has no past surgical history on file. SOCIAL HISTORY   reports that he has quit smoking. He has never used smokeless tobacco. He reports current alcohol use. He reports that he does not use drugs. FAMILY HISTORY  No family history of premature coronary artery disease, aortic disease, or valve disease. HOME CARDIAC MEDICATIONS  Were reviewed and are listed in nursing record. and/or listed below  Prior to Admission medications    Medication Sig Start Date End Date Taking?  Authorizing Provider   ezetimibe (ZETIA) 10 MG tablet Take 1 tablet by mouth daily 3/21/23  Yes Historical Provider, MD   lisinopril (PRINIVIL;ZESTRIL) 20 MG tablet Take 1 tablet by mouth daily 3/20/23  Yes Historical Provider, MD        ALLERGIES  Aspirin, Kiwi extract, and Zithromax

## 2023-06-19 NOTE — PROCEDURES
CARDIAC CATHETERIZATION REPORT     Procedure Date:  2023  Patient Name: Chanel Parnell  MRN: [id number] : 1975      : Fabrice Fajardo MD      PROCEDURES PERFORMED  Left heart cath via right radial approach  Coronary angiography  Left ventriculogram  Moderate sedation 15 min CPT 67362  Sedation start time: 1429  Sedation end time: [address] guidance for vascular access CPT 06767      INDICATION  Chest pain, elevated troponin    PROCEDURE DESCRIPTION  Risks/benefits/alternatives/outcomes were discussed with patient and/or family in detail and informed consent was obtained. Patient was prepped and draped in the usual sterile fashion. Versed and fentanyl were used for conscious sedation. Then local anaesthetic was applied over right radial puncture site. Using a modified Seldinger technique, the right radial artery was selectively cannulated and a 6Fr Terumo sheath was inserted into right radial artery. Verapamil and nitroglycerin were administered through the sheath. Heparin was administered. Diagnostic 6Fr JL3.5 and JR4 were used to engage left and right coronary arteries respectively and obtain angiogram. LVEDP and left ventriculogram were obtained by using the JR4 diagnostic catheter. At the conclusion of the procedure, a TR band was placed over the puncture site and hemostasis was obtained. There were no immediate complications. I supervised the sedation with fentanyl and midazolam. An independent trained observer pushed Well at my direction. We monitored the patient's level of consciousness and vital signs/physiologic status throughout the procedure duration. At the end of the procedure, the radial sheath was removed and a TR band was placed over the arteriotomy site. Patient tolerated the procedure well.       FINDINGS  Left main - Normal  LAD - Normal  Left circumflex - Normal  RCA - Normal    LVEDP: 15  Left ventriculogram - EF 60% with normal wall motion      SEDATION: Moderate

## 2023-06-19 NOTE — DISCHARGE INSTRUCTIONS
Cath Labs at  [facility]   Discharge Instructions        6/19/2023  Adi Vazquez   Date of Birth 1975       Activity:  No driving for 24 hours. In 24 hours you may remove dressing and shower, wash site gently with soap and water and leave open to air  Avoid submerging your arm in sitting water for 5 days. Do not use your right hand for 24 hours, then  No lifting more than 5 pounds for 5 days. No lotions, powders, or ointments near site for 5 days. No work/school for 5 days unless instructed otherwise by your cardiologist.    Diet:   Resume previous diet, if a cardiac diet is specified you will receive a handout with  general guidelines. Drink extra non-alcoholic/decaffienated fluids for first 24 hours after your procedure. Arm Management:  If bleeding occurs from the site or a hematoma (lump) begins to increase in size, apply pressure directly over the site, call 911 to return to the hospital.    Special Instructions:  Report any coolness or numbness in the arm  Report any chills, fever, itching, red bumps or rash   Report any of the following to the MD: drainage from the site, redness and/or swelling at the site, increased tenderness at the site   If you are currently taking Metformin or Metformin combination medications for Diabetes, hold your dose for 48 hours after your procedure. Consult your Cardiologist before taking any NSAIDS, vitamin supplements, estrogen, or estrogen plus progestin. Do not stop taking Plavix, Brilinta or Effient, without first consulting your cardiologist.    Sedation Discharge Instructions: For the next 24 hours do not drive a car, operate machinery, power tools or kitchen appliances. Do not drink alcohol; including beer or wine. Do not make any important decisions or sign any important papers. For the next 24 hours you can expect drowsiness, light-headed or dizziness, nausea/ vomiting, inability to concentrate, fatigue and desire to sleep.   We

## 2023-06-23 PROBLEM — R07.9 CHEST PAIN: Status: ACTIVE | Noted: 2023-06-23
